# Patient Record
Sex: FEMALE | Race: ASIAN | Employment: STUDENT | URBAN - METROPOLITAN AREA
[De-identification: names, ages, dates, MRNs, and addresses within clinical notes are randomized per-mention and may not be internally consistent; named-entity substitution may affect disease eponyms.]

---
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
## 2025-02-23 NOTE — DISCHARGE INSTRUCTIONS
Please return to the ER/clinic if symptoms worsen. Follow-up with your PCP in 24-48 hours as needed.    Avoid picking or scratching at the areas.  Use the twice daily as prescribed.  Wash your towels etc. frequently.  Tepid showers no baths or hot water.  No scented lotions detergents or products.  Recommend Curel for sensitive skin.  Make a follow-up appointment with dermatology for further evaluation and treatment.

## 2025-02-23 NOTE — ED PROVIDER NOTES
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  Patient denies any new lotion detergents or products.  Patient does live with roommates.  Patient does not have any pets.  Patient states is not really itchy.  Patient denies chest pain, shortness of breath, cough, abdominal pain, nausea, vomiting or diarrhea.  Patient denies any lip swelling wheezing etc.  Afebrile.      Objective:     History reviewed. No pertinent past medical history.           History reviewed. No pertinent surgical history.           The patient's medication list, past medical history and social history elements  as listed in today's nurse's notes are reviewed and agree.   The patient's family history is reviewed and is noncontributory to the presenting problem, except as indicated as above.     Social History     Socioeconomic History    Marital status: Single   Tobacco Use    Smoking status: Never   Vaping Use    Vaping status: Never Used   Substance and Sexual Activity    Alcohol use: Never    Drug use: Never              Review of Systems    Positive for stated complaint: body rash  Other systems are as noted in HPI.  Constitutional and vital signs reviewed.      All other systems reviewed and negative except as noted above.    Physical Exam     ED Triage Vitals [02/23/25 0847]   /84   Pulse 55   Resp 16   Temp 98 °F (36.7 °C)   Temp src Oral   SpO2 99 %   O2 Device None (Room air)       Current Vitals:   Vital Signs  BP: 133/84  Pulse: 55  Resp: 16  Temp: 98 °F (36.7 °C)  Temp src: Oral    Oxygen Therapy  SpO2: 99 %  O2 Device: None (Room air)        Physical Exam  Vitals and nursing note reviewed.   Constitutional:       Appearance: Normal appearance. She is well-developed.   HENT:      Head:  Normocephalic.      Right Ear: External ear normal.      Left Ear: External ear normal.      Nose: Nose normal.      Mouth/Throat:      Mouth: Mucous membranes are moist.   Eyes:      Conjunctiva/sclera: Conjunctivae normal.      Pupils: Pupils are equal, round, and reactive to light.   Cardiovascular:      Rate and Rhythm: Normal rate and regular rhythm.      Heart sounds: Normal heart sounds.   Pulmonary:      Effort: Pulmonary effort is normal.      Breath sounds: Normal breath sounds.   Musculoskeletal:      Cervical back: Normal range of motion and neck supple.   Skin:     General: Skin is warm.      Capillary Refill: Capillary refill takes less than 2 seconds.      Findings: Lesion and rash present. Rash is papular and pustular.      Comments: circular raised lesions noted to the chin/chest/groin/back: no fluctuance/warmth/streaking noted   Neurological:      General: No focal deficit present.      Mental Status: She is alert and oriented to person, place, and time.   Psychiatric:         Mood and Affect: Mood normal.         Behavior: Behavior normal.         Thought Content: Thought content normal.         Judgment: Judgment normal.           ED Course        NOTE: WE discussed the fact that it could be nummular eczema versus tinea corporis.  She will start with clotrimazole/betamethasone and follow-up with dermatology for further evaluation and treatment.           MDM       Clinical Impression: circular raised rash  Course of Treatment:   Avoid picking or scratching at the areas.  Use the twice daily as prescribed.  Wash your towels etc. frequently.  Tepid showers no baths or hot water.  No scented lotions detergents or products.  Recommend Curel for sensitive skin.  Make a follow-up appointment with dermatology for further evaluation and treatment.      The patient is encouraged to return if any concerning symptoms arise. Additional verbal discharge instructions are given and discussed. Discharge  medications are discussed. The patient is in good condition throughout the visit today and remains so upon discharge. I discuss the plan of care with the patient, who expresses understanding. All questions and concerns are addressed to the patient's satisfaction prior to discharge today.  Previous conversations with PCP and charts were reviewed.            Disposition and Plan     Clinical Impression:  1. Rash/skin eruption         Disposition:  Discharge  2/23/2025  9:10 am    Follow-up:  Samira Gilman MD  [address] [place] [id number]  [phone]          Gaby Bunn MD  [address] [place] [address] [id number] [phone]                Medications Prescribed:  Current Discharge Medication List        START taking these medications    Details   clotrimazole-betamethasone 1-0.05 % External Cream Apply 1 Application topically 2 (two) times daily.  Qty: 1 each, Refills: 0                 Supplementary Documentation:

## 2025-02-23 NOTE — ED INITIAL ASSESSMENT (HPI)
Patient presents to IC with c/o circular rash to left jaw, chest, groin, and back x 1 week.  Denies fever.

## (undated) DIAGNOSIS — H60.313 DIFFUSE OTITIS EXTERNA, BILATERAL: ICD-10-CM